# Patient Record
Sex: FEMALE | Race: WHITE
[De-identification: names, ages, dates, MRNs, and addresses within clinical notes are randomized per-mention and may not be internally consistent; named-entity substitution may affect disease eponyms.]

---

## 2018-01-06 ENCOUNTER — HOSPITAL ENCOUNTER (EMERGENCY)
Dept: HOSPITAL 80 - CED | Age: 33
Discharge: HOME | End: 2018-01-06
Payer: MEDICAID

## 2018-01-06 VITALS
SYSTOLIC BLOOD PRESSURE: 135 MMHG | HEART RATE: 83 BPM | DIASTOLIC BLOOD PRESSURE: 72 MMHG | OXYGEN SATURATION: 95 % | RESPIRATION RATE: 16 BRPM | TEMPERATURE: 99.5 F

## 2018-01-06 DIAGNOSIS — J11.1: Primary | ICD-10-CM

## 2018-01-06 NOTE — EDPHY
H & P


Time Seen by Provider: 18 08:05


HPI/ROS: 


HPI





Flu-like symptoms.





32-year-old female by private vehicle.  She complains of fever, muscle aches 

and joint aches, nasal congestion with clear rhinorrhea, mild intermittent 

cough and fatigue onset of symptoms yesterday.  No ill contacts.





ROS:





Constitutional:  As above.


Eyes:  No discharge.  No changes in vision.


ENT:  No sore throat. As above.


Respiratory:  As above.  No shortness of breath.


Cardiac:  No chest pain, no palpitations.


Gastrointestinal:  No abdominal pain, no vomiting, no diarrhea.


Genitourinary:  No hematuria.  No dysuria or increased frequency with urination.


Musculoskeletal:  No back pain.  No neck pain.  No myalgias or arthralgias.


Skin:  No rashes.


Neurological:  No headache.  No focal weakness or altered sensation.





Past medical history:  Denies any past medical history.





Social history:  Here with her son.  Nonsmoker.  No alcohol.





Physical Exam:





General Appearance:  Alert, no distress.  This patient is responding to 

questions appropriately and in full sentences.  This patient appears well-

hydrated and well-nourished.


Eyes:  Pupils equal and round no pallor or injection.  No lid edema, erythema 

or injection.


ENT, Mouth:  Mucous membranes are moist.  The pharyngeal tissues are 

unremarkable.  No edema or swelling.  No asymmetry suggestive of abscess.  No 

erythema or exudates. No stridor on auscultation of her neck.  No significant 

cervical, submandibular, submental lymphadenopathy.


Respiratory:  There are no retractions, lungs are clear to auscultation with 

good air movement bilaterally.


Cardiovascular:  Regular rate and rhythm.  No murmur.


Neurological:  Motor sensory function is grossly intact.  Cranial nerves are 

normal.  Gait is normal.


Skin:  Warm and dry, no rashes.


Musculoskeletal:  Neck is supple and nontender. No pain on flexion of her neck.


Extremities are symmetrical.  All joints range without pain or impingement.


Psychiatric:  No agitation.  No depression.





Database:





EKG:





Imaging:





Procedures:





Emergency department course:





Vital signs reviewed and are unremarkable. Temperature is 37.5degrees.  Patient'

s presentation is consistent with influenza.  She falls within the treatment 

window for Tamiflu.  Plan will be to prescribe her this medication as well as 

Tylenol and ibuprofen.  Supportive care discussed.  She feels comfortable going 

home.  She does not appear toxic.  Follow-up and return to emergency department 

precautions reviewed with her.  All of her questions were answered.  She was 

discharged in good condition.





Differential Diagnosis:





The differential diagnosis on this patient includes but is not limited to 

influenza, viral syndrome.  Pneumonia, meningitis, encephalitis, other serious 

bacterial infection unlikely.  This represents a partial list of diagnoses 

considered.  These considerations are based on history, physical exam, past 

history, reassessment and diagnostic testing.


Smoking Status: Never smoked


Constitutional: 


 Initial Vital Signs











Temperature (C)  37.5 C   18 08:12


 


Heart Rate  83   18 08:12


 


Respiratory Rate  16   18 08:12


 


Blood Pressure  135/72 H  18 08:12


 


O2 Sat (%)  95   18 08:12








 











O2 Delivery Mode               Room Air














Allergies/Adverse Reactions: 


 





No Known Allergies Allergy (Verified 18 08:15)


 








Home Medications: 














 Medication  Instructions  Recorded


 


Bcp  18


 


Oseltamivir Phosphate [Tamiflu 75 75 mg PO BID #10 cap 18





mg (RX)]  














Departure





- Departure


Disposition: Home, Routine, Self-Care


Clinical Impression: 


 Influenza





Condition: Good


Instructions:  Influenza (ED)


Additional Instructions: 


Read and follow provided instructions.





Follow-up with your primary care physician in 2-3 days for re-evaluation.





Take medication as prescribed through entire course of treatment.





Ibuprofen dosin mg every 6 hours with meals for the next 3 days only.





You can also take Tylenol, 650 mg every 6-8 hr for the next 3 days only as 

directed.  You can take this at the same time as you take ibuprofen.





Return to the emergency department for worsening symptoms, difficulty breathing

, vomiting and inability to keep fluids down or other serious concerns.


Referrals: 


NONE *PRIMARY CARE P,. [Primary Care Provider] - As per Instructions


Prescriptions: 


Oseltamivir Phosphate [Tamiflu 75 mg (RX)] 75 mg PO BID #10 cap

## 2018-09-05 ENCOUNTER — HOSPITAL ENCOUNTER (OUTPATIENT)
Dept: HOSPITAL 80 - CIMAGING | Age: 33
End: 2018-09-05
Attending: INTERNAL MEDICINE
Payer: MEDICAID

## 2018-09-05 DIAGNOSIS — N94.89: ICD-10-CM

## 2018-09-05 DIAGNOSIS — K59.00: Primary | ICD-10-CM

## 2019-04-02 ENCOUNTER — HOSPITAL ENCOUNTER (OUTPATIENT)
Dept: HOSPITAL 80 - FIMAGING | Age: 34
End: 2019-04-02
Attending: NURSE PRACTITIONER
Payer: MEDICAID

## 2019-04-02 DIAGNOSIS — N94.89: Primary | ICD-10-CM

## 2019-06-03 ENCOUNTER — HOSPITAL ENCOUNTER (OUTPATIENT)
Dept: HOSPITAL 80 - FIMAGING | Age: 34
Discharge: HOME | End: 2019-06-03
Attending: RADIOLOGY
Payer: MEDICAID

## 2019-06-03 VITALS — SYSTOLIC BLOOD PRESSURE: 123 MMHG | DIASTOLIC BLOOD PRESSURE: 81 MMHG

## 2019-06-03 DIAGNOSIS — N94.89: Primary | ICD-10-CM

## 2019-06-03 DIAGNOSIS — R10.2: ICD-10-CM

## 2019-06-03 LAB
INR PPP: 1.01 (ref 0.83–1.16)
PLATELET # BLD: 268 10^3/UL (ref 150–400)
PROTHROMBIN TIME: 12.9 SEC (ref 12–15)

## 2019-06-03 PROCEDURE — 06LJ3DZ OCCLUSION OF LEFT HYPOGASTRIC VEIN WITH INTRALUMINAL DEVICE, PERCUTANEOUS APPROACH: ICD-10-PCS | Performed by: RADIOLOGY

## 2019-06-03 PROCEDURE — 75736 ARTERY X-RAYS PELVIS: CPT

## 2019-06-03 PROCEDURE — 76937 US GUIDE VASCULAR ACCESS: CPT

## 2019-06-03 PROCEDURE — 99152 MOD SED SAME PHYS/QHP 5/>YRS: CPT

## 2019-06-03 PROCEDURE — C1769 GUIDE WIRE: HCPCS

## 2019-06-03 PROCEDURE — 06LH3DZ OCCLUSION OF RIGHT HYPOGASTRIC VEIN WITH INTRALUMINAL DEVICE, PERCUTANEOUS APPROACH: ICD-10-PCS | Performed by: RADIOLOGY

## 2019-06-03 PROCEDURE — 99153 MOD SED SAME PHYS/QHP EA: CPT

## 2019-06-03 PROCEDURE — 36012 PLACE CATHETER IN VEIN: CPT

## 2019-06-03 PROCEDURE — B543ZZA ULTRASONOGRAPHY OF RIGHT JUGULAR VEINS, GUIDANCE: ICD-10-PCS | Performed by: RADIOLOGY

## 2019-06-03 PROCEDURE — 37241 VASC EMBOLIZE/OCCLUDE VENOUS: CPT

## 2019-06-03 PROCEDURE — C1892 INTRO/SHEATH,FIXED,PEEL-AWAY: HCPCS

## 2019-06-03 PROCEDURE — B51H1ZZ FLUOROSCOPY OF BILATERAL PELVIC (ILIAC) VEINS USING LOW OSMOLAR CONTRAST: ICD-10-PCS | Performed by: RADIOLOGY

## 2019-06-03 NOTE — PDGENHP
History & Physical


Chief Complaint: PELVIC PAIN


History of Present Illness: EXTENSIVE BILATERAL GONADAL VEINS WITH PELVIC PAIN.


Pertinent Past, Social, Family History: NON SMOKER; HAS ONE CHILD.


Relevant Physical Exam: SUBJECTIVE LEG VEINS WITHOUT VARICOSITIES.  PELVIC PAIN.


Cardiorespiratory Assessment: RRR, CTA

## 2019-06-03 NOTE — PDRADPN
Radiology Procedure Note


Date of Procedure: 06/03/19


Radiologist: Jennifer Becerril


Anesthesia: IV Sedation


Pre-op Diagnosis: PELVIC CONGESTION


Post-op Diagnosis: SAME


Indication: SEVERE PAIN


Procedure: BILATERAL GONDAL VEIN EMBOLIZATION


Finding(s): BILATERAL REFLUX IDENTIFIED.


Inf/Abcess present in the surg proc area at time of surgery?: No